# Patient Record
Sex: MALE | Race: WHITE | HISPANIC OR LATINO | Employment: PART TIME | URBAN - METROPOLITAN AREA
[De-identification: names, ages, dates, MRNs, and addresses within clinical notes are randomized per-mention and may not be internally consistent; named-entity substitution may affect disease eponyms.]

---

## 2018-01-21 ENCOUNTER — HOSPITAL ENCOUNTER (EMERGENCY)
Facility: HOSPITAL | Age: 53
Discharge: HOME | End: 2018-01-21
Attending: EMERGENCY MEDICINE | Admitting: EMERGENCY MEDICINE

## 2018-01-21 VITALS
DIASTOLIC BLOOD PRESSURE: 84 MMHG | OXYGEN SATURATION: 98 % | HEART RATE: 104 BPM | RESPIRATION RATE: 20 BRPM | SYSTOLIC BLOOD PRESSURE: 150 MMHG | HEIGHT: 69 IN | WEIGHT: 180 LBS | TEMPERATURE: 98.5 F | BODY MASS INDEX: 26.66 KG/M2

## 2018-01-21 DIAGNOSIS — E11.649 HYPOGLYCEMIA ASSOCIATED WITH DIABETES (HCC): Primary | ICD-10-CM

## 2018-01-21 LAB
GLUCOSE BLDC GLUCOMTR-MCNC: 161 MG/DL (ref 70–130)
GLUCOSE BLDC GLUCOMTR-MCNC: 50 MG/DL (ref 70–130)

## 2018-01-21 PROCEDURE — 99284 EMERGENCY DEPT VISIT MOD MDM: CPT

## 2018-01-21 PROCEDURE — 82962 GLUCOSE BLOOD TEST: CPT

## 2018-01-21 RX ORDER — INSULIN GLARGINE 100 [IU]/ML
INJECTION, SOLUTION SUBCUTANEOUS DAILY
COMMUNITY

## 2018-01-21 NOTE — ED PROVIDER NOTES
EMERGENCY DEPARTMENT ENCOUNTER    CHIEF COMPLAINT  Chief Complaint: hypoglycemia   History given by: patient   History limited by: n/a  Room Number: 18/18  PMD: No Known Provider      HPI:  Pt is a 52 y.o. male who presents secondary to hypoglycemia. Pt is an insulin dependant diabetic, and blood glucose on EMS arrival was 21. Pt was given oral glucose with improvement to 42. Pt states that he has missed some meals today, but denies recent illness. He has no complaints at this time.     Duration:  Prior to arrival  Onset: unknown  Timing: constant   Location: n/a  Radiation: n/a  Quality: initial blood glucose of 21  Intensity/Severity: severe  Progression: improved   Associated Symptoms: none  Aggravating Factors: missing meals   Alleviating Factors: oral glucose   Previous Episodes: hx of diabetes   Treatment before arrival: oral glucose     PAST MEDICAL HISTORY  Active Ambulatory Problems     Diagnosis Date Noted   • No Active Ambulatory Problems     Resolved Ambulatory Problems     Diagnosis Date Noted   • No Resolved Ambulatory Problems     Past Medical History:   Diagnosis Date   • Diabetes mellitus        PAST SURGICAL HISTORY  History reviewed. No pertinent surgical history.    FAMILY HISTORY  History reviewed. No pertinent family history.    SOCIAL HISTORY  Social History     Social History   • Marital status:      Spouse name: N/A   • Number of children: N/A   • Years of education: N/A     Occupational History   • Not on file.     Social History Main Topics   • Smoking status: Never Smoker   • Smokeless tobacco: Not on file   • Alcohol use Yes   • Drug use: Not on file   • Sexual activity: Not on file     Other Topics Concern   • Not on file     Social History Narrative   • No narrative on file       ALLERGIES  Review of patient's allergies indicates no known allergies.    REVIEW OF SYSTEMS  Review of Systems   Constitutional: Negative for chills and fever.   HENT: Negative for congestion and sore  throat.    Eyes: Negative.    Respiratory: Negative for cough and shortness of breath.    Cardiovascular: Negative for chest pain and leg swelling.   Gastrointestinal: Negative for abdominal pain, diarrhea and vomiting.   Endocrine:        Hypoglycemia   Genitourinary: Negative for difficulty urinating and dysuria.   Musculoskeletal: Negative for back pain and neck pain.   Skin: Negative for rash and wound.   Allergic/Immunologic: Negative.    Neurological: Negative for dizziness, weakness, numbness and headaches.   Psychiatric/Behavioral: Negative.    All other systems reviewed and are negative.      PHYSICAL EXAM  ED Triage Vitals   Temp Heart Rate Resp BP SpO2   -- 01/21/18 0136 01/21/18 0136 01/21/18 0136 01/21/18 0136    93 20 128/78 97 %      Temp src Heart Rate Source Patient Position BP Location FiO2 (%)   -- 01/21/18 0136 -- -- --    Monitor          Physical Exam   Constitutional: He is oriented to person, place, and time and well-developed, well-nourished, and in no distress.   HENT:   Head: Normocephalic and atraumatic.   Eyes: EOM are normal. Pupils are equal, round, and reactive to light.   Neck: Normal range of motion. Neck supple.   Cardiovascular: Normal rate, regular rhythm and normal heart sounds.    Pulmonary/Chest: Effort normal and breath sounds normal. No respiratory distress.   Abdominal: Soft. There is no tenderness. There is no rebound and no guarding.   Musculoskeletal: Normal range of motion. He exhibits no edema.   Neurological: He is alert and oriented to person, place, and time. He has normal sensation and normal strength.   Skin: Skin is warm and dry.   Psychiatric: Mood and affect normal.   Nursing note and vitals reviewed.      LAB RESULTS  Lab Results (last 24 hours)     Procedure Component Value Units Date/Time    POC Glucose Once [[id number]]  (Abnormal) Collected:  01/21/18 0147    Specimen:  Blood Updated:  01/21/18 0151     Glucose 50 (L) mg/dL     Narrative:       RN Notified  R and V Meter: [id number] : [id number] Angel Hassan ER TECH    POC Glucose Once [[id number]]  (Abnormal) Collected:  01/21/18 0219    Specimen:  Blood Updated:  01/21/18 0220     Glucose 161 (H) mg/dL     Narrative:       Meter: [id number] : [id number] Vance CORTES ER Tech          I ordered the above labs and reviewed the results    PROCEDURES  Procedures      PROGRESS AND CONSULTS  ED Course     01:51  POC glucose ordered.     02:04  BP- 128/78 HR- 93 Temp- 98.5 °F (36.9 °C) (Tympanic) O2 sat- 97%  Advised pt of the plan to monitor blood sugar in the ED.  Will feed pt. Pt understands and agrees with the plan, all questions answered.    02;19  Blood glucose is 161.     02:50  Blood glucose is now improved. Pt will be discharged. Pt understands and agrees with the plan, all questions answered.    MEDICAL DECISION MAKING  Results were reviewed/discussed with the patient and they were also made aware of online access. Pt also made aware that some labs, such as cultures, will not be resulted during ER visit and follow up with PMD is necessary.     MDM  Number of Diagnoses or Management Options  Hypoglycemia associated with diabetes:      Amount and/or Complexity of Data Reviewed  Clinical lab tests: ordered and reviewed (Blood glucose=161)    Patient Progress  Patient progress: stable         DIAGNOSIS  Final diagnoses:   Hypoglycemia associated with diabetes       DISPOSITION  DISCHARGE    Patient discharged in stable condition.    Reviewed implications of results, diagnosis, meds, responsibility to follow up, warning signs and symptoms of possible worsening, potential complications and reasons to return to ER.    Patient/Family voiced understanding of above instructions.    Discussed plan for discharge, as there is no emergent indication for admission.  Pt/family is agreeable and understands need for follow up and repeat testing.  Pt is aware that discharge does not mean that nothing is wrong but it  indicates no emergency is present that requires admission and they must continue care with follow-up as given below or physician of their choice.     FOLLOW-UP  [facility]YSICAN REFERRAL SERVICE  [facility] [id number] [phone]             Medication List      Notice     No changes were made to your prescriptions during this visit.        Latest Documented Vital Signs:  As of 2:49 AM  BP- 150/84 HR- 93 Temp- 98.5 °F (36.9 °C) (Tympanic) O2 sat- 99%    --  Documentation assistance provided by ramiro Canseco for Dr Rogers.  Information recorded by the scradeola was done at my direction and has been verified and validated by me.        Quin Canseco  01/21/18 8179       Bradford Rogers MD  01/21/18 2346

## 2018-01-21 NOTE — ED NOTES
Pt in hallway on stretcher drinking OJ while room is cleaned.      Lizbeth Esquivel RN  01/21/18 0143

## 2018-01-21 NOTE — ED NOTES
"Pt to Room 18 with c/o hypoglycemia starting around 2300 last night.  Pt states that he is a Type 1 DM and does taken insulin daily.  Pt states \"I am feeling much better right now.\"  Pt does not presently have symptoms of hypoglycemia, currently laughing with this nurse and spouse.  Pt is alert and oriented X4, PERRLA, respirations are even and unlabored, chest rise and fall is equal in expansion.  Pt does not appear to be in distress at this time.  Pt denies fever, chills, n/v/d, blurred vision, chest pain, abdominal pain, loss in control of bowel/bladder.  Bed in low position, call light within reach, side rails up X2.      Marylin Walker RN  01/21/18 0219    "

## 2018-01-21 NOTE — ED NOTES
Pt drank 2 OJs, BS still critical low. Tech to get 2 more OJ while RN starts IV.      Lizbeth Esquivel RN  01/21/18 0149

## 2018-01-21 NOTE — ED NOTES
EMS reports they were called for hypoglycemia. Report glucose was 21 and gave oral glucose now glucose 42 now.     Raj Shaw RN  01/21/18 0133